# Patient Record
Sex: FEMALE | Race: WHITE | NOT HISPANIC OR LATINO | URBAN - NONMETROPOLITAN AREA
[De-identification: names, ages, dates, MRNs, and addresses within clinical notes are randomized per-mention and may not be internally consistent; named-entity substitution may affect disease eponyms.]

---

## 2024-02-14 ENCOUNTER — TELEPHONE (OUTPATIENT)
Dept: CARDIOLOGY | Facility: CLINIC | Age: 89
End: 2024-02-14

## 2024-02-14 NOTE — TELEPHONE ENCOUNTER
Daughter phoned in stating patient is at pcp Dr. Nickerson office for symptoms of being feeble, incontinence, unsteady, very confused of surroundings. She states patient might need a sooner appointment with Dr. Fredis Catalan MD which is scheduled on 08/27/2024.    Advise we will reach out to Dr. Nickerson office, to see if any testing or labs where done and go from there. Advise patient could possible have uti due to symptoms. Advises will keep office updated.

## 2024-02-27 NOTE — TELEPHONE ENCOUNTER
Phoned and spoke with daughter. States that seems to be doing a little better. Has a little more strength and was able to dress herself. Thinks she is just going downhill somewhat. Did state that PCP did labs and urine test on her. Advised that WBC slightly elevated but no other problems. Repeating in 2 weeks.    To Dr. Fredis Catalan MD for review.

## 2024-02-27 NOTE — TELEPHONE ENCOUNTER
Phoned and spoke with daughter. Advised that does not sound cardiac related. Verbalized understanding.

## 2024-03-25 PROBLEM — I25.10 ASHD (ARTERIOSCLEROTIC HEART DISEASE): Status: ACTIVE | Noted: 2024-03-25

## 2024-03-25 PROBLEM — M75.80 ROTATOR CUFF TENDINITIS: Status: ACTIVE | Noted: 2024-03-25

## 2024-03-25 PROBLEM — K63.9 BOWEL TROUBLE: Status: ACTIVE | Noted: 2024-03-25

## 2024-03-25 PROBLEM — I48.20 CHRONIC ATRIAL FIBRILLATION (MULTI): Status: ACTIVE | Noted: 2024-03-25

## 2024-03-25 PROBLEM — N32.9 BLADDER TROUBLES: Status: ACTIVE | Noted: 2024-03-25

## 2024-03-25 PROBLEM — I10 HYPERTENSION: Status: ACTIVE | Noted: 2024-03-25

## 2024-03-25 PROBLEM — K92.9 DIGESTIVE PROBLEMS: Status: ACTIVE | Noted: 2024-03-25

## 2024-03-25 PROBLEM — R55 SYNCOPE: Status: ACTIVE | Noted: 2024-03-25

## 2024-03-25 PROBLEM — E78.5 HYPERLIPIDEMIA: Status: ACTIVE | Noted: 2024-03-25

## 2024-03-25 RX ORDER — ALENDRONATE SODIUM 70 MG/1
70 TABLET ORAL
COMMUNITY

## 2024-03-25 RX ORDER — DIETHYLTOLUAMIDE 7 %
SPRAY, NON-AEROSOL (ML) TOPICAL
COMMUNITY

## 2024-03-25 RX ORDER — SIMVASTATIN 10 MG/1
10 TABLET, FILM COATED ORAL EVERY EVENING
COMMUNITY

## 2024-03-25 RX ORDER — ACETAMINOPHEN 500 MG
1 TABLET ORAL DAILY
COMMUNITY
Start: 2021-05-06

## 2024-03-25 RX ORDER — METOPROLOL SUCCINATE 25 MG/1
25 TABLET, EXTENDED RELEASE ORAL DAILY
COMMUNITY

## 2024-03-25 RX ORDER — OMEPRAZOLE 20 MG/1
20 CAPSULE, DELAYED RELEASE ORAL DAILY
COMMUNITY

## 2024-08-21 ENCOUNTER — TELEPHONE (OUTPATIENT)
Dept: CARDIOLOGY | Facility: CLINIC | Age: 89
End: 2024-08-21